# Patient Record
Sex: FEMALE | Race: WHITE | NOT HISPANIC OR LATINO | URBAN - METROPOLITAN AREA
[De-identification: names, ages, dates, MRNs, and addresses within clinical notes are randomized per-mention and may not be internally consistent; named-entity substitution may affect disease eponyms.]

---

## 2023-10-04 ENCOUNTER — APPOINTMENT (OUTPATIENT)
Dept: RADIOLOGY | Facility: HOSPITAL | Age: 18
DRG: 399 | End: 2023-10-04
Payer: COMMERCIAL

## 2023-10-04 ENCOUNTER — HOSPITAL ENCOUNTER (OUTPATIENT)
Facility: HOSPITAL | Age: 18
Setting detail: OBSERVATION
LOS: 1 days | Discharge: HOME | DRG: 399 | End: 2023-10-05
Attending: SURGERY | Admitting: SURGERY
Payer: COMMERCIAL

## 2023-10-04 DIAGNOSIS — K37 APPENDICITIS: Primary | ICD-10-CM

## 2023-10-04 LAB
ALBUMIN SERPL BCP-MCNC: 4.6 G/DL (ref 3.4–5)
ALP SERPL-CCNC: 45 U/L (ref 33–110)
ALT SERPL W P-5'-P-CCNC: 11 U/L (ref 7–45)
ANION GAP SERPL CALC-SCNC: 16 MMOL/L (ref 10–20)
APPEARANCE UR: ABNORMAL
AST SERPL W P-5'-P-CCNC: 16 U/L (ref 9–39)
BACTERIA #/AREA URNS AUTO: ABNORMAL /HPF
BASOPHILS # BLD AUTO: 0.03 X10*3/UL (ref 0–0.1)
BASOPHILS NFR BLD AUTO: 0.1 %
BILIRUB SERPL-MCNC: 0.6 MG/DL (ref 0–1.2)
BILIRUB UR STRIP.AUTO-MCNC: NEGATIVE MG/DL
BUN SERPL-MCNC: 10 MG/DL (ref 6–23)
CALCIUM SERPL-MCNC: 9.9 MG/DL (ref 8.6–10.3)
CHLORIDE SERPL-SCNC: 104 MMOL/L (ref 98–107)
CO2 SERPL-SCNC: 21 MMOL/L (ref 21–32)
COLOR UR: YELLOW
CREAT SERPL-MCNC: 0.63 MG/DL (ref 0.5–1.05)
EOSINOPHIL # BLD AUTO: 0 X10*3/UL (ref 0–0.7)
EOSINOPHIL NFR BLD AUTO: 0 %
ERYTHROCYTE [DISTWIDTH] IN BLOOD BY AUTOMATED COUNT: 12.2 % (ref 11.5–14.5)
GFR SERPL CREATININE-BSD FRML MDRD: >90 ML/MIN/1.73M*2
GLUCOSE SERPL-MCNC: 102 MG/DL (ref 74–99)
GLUCOSE UR STRIP.AUTO-MCNC: NEGATIVE MG/DL
HCG UR QL IA.RAPID: NEGATIVE
HCT VFR BLD AUTO: 39.7 % (ref 36–46)
HGB BLD-MCNC: 13.1 G/DL (ref 12–16)
IMM GRANULOCYTES # BLD AUTO: 0.13 X10*3/UL (ref 0–0.7)
IMM GRANULOCYTES NFR BLD AUTO: 0.5 % (ref 0–0.9)
KETONES UR STRIP.AUTO-MCNC: ABNORMAL MG/DL
LACTATE SERPL-SCNC: 1.5 MMOL/L (ref 0.4–2)
LEUKOCYTE ESTERASE UR QL STRIP.AUTO: ABNORMAL
LYMPHOCYTES # BLD AUTO: 0.51 X10*3/UL (ref 1.2–4.8)
LYMPHOCYTES NFR BLD AUTO: 2.1 %
MCH RBC QN AUTO: 29 PG (ref 26–34)
MCHC RBC AUTO-ENTMCNC: 33 G/DL (ref 32–36)
MCV RBC AUTO: 88 FL (ref 80–100)
MONOCYTES # BLD AUTO: 1.12 X10*3/UL (ref 0.1–1)
MONOCYTES NFR BLD AUTO: 4.7 %
MUCOUS THREADS #/AREA URNS AUTO: ABNORMAL /LPF
NEUTROPHILS # BLD AUTO: 22.11 X10*3/UL (ref 1.2–7.7)
NEUTROPHILS NFR BLD AUTO: 92.6 %
NITRITE UR QL STRIP.AUTO: NEGATIVE
NRBC BLD-RTO: 0 /100 WBCS (ref 0–0)
PH UR STRIP.AUTO: 5 [PH]
PLATELET # BLD AUTO: 299 X10*3/UL (ref 150–450)
PMV BLD AUTO: 11.4 FL (ref 7.5–11.5)
POTASSIUM SERPL-SCNC: 3.8 MMOL/L (ref 3.5–5.3)
PROT SERPL-MCNC: 7.8 G/DL (ref 6.4–8.2)
PROT UR STRIP.AUTO-MCNC: ABNORMAL MG/DL
RBC # BLD AUTO: 4.51 X10*6/UL (ref 4–5.2)
RBC # UR STRIP.AUTO: NEGATIVE /UL
RBC #/AREA URNS AUTO: ABNORMAL /HPF
SODIUM SERPL-SCNC: 137 MMOL/L (ref 136–145)
SP GR UR STRIP.AUTO: 1.03
SQUAMOUS #/AREA URNS AUTO: ABNORMAL /HPF
UROBILINOGEN UR STRIP.AUTO-MCNC: <2 MG/DL
WBC # BLD AUTO: 23.9 X10*3/UL (ref 4.4–11.3)
WBC #/AREA URNS AUTO: ABNORMAL /HPF

## 2023-10-04 PROCEDURE — 96375 TX/PRO/DX INJ NEW DRUG ADDON: CPT

## 2023-10-04 PROCEDURE — 74177 CT ABD & PELVIS W/CONTRAST: CPT

## 2023-10-04 PROCEDURE — 2550000001 HC RX 255 CONTRASTS: Performed by: REGISTERED NURSE

## 2023-10-04 PROCEDURE — 74177 CT ABD & PELVIS W/CONTRAST: CPT | Performed by: STUDENT IN AN ORGANIZED HEALTH CARE EDUCATION/TRAINING PROGRAM

## 2023-10-04 PROCEDURE — 1210000001 HC SEMI-PRIVATE ROOM DAILY

## 2023-10-04 PROCEDURE — 99285 EMERGENCY DEPT VISIT HI MDM: CPT | Performed by: EMERGENCY MEDICINE

## 2023-10-04 PROCEDURE — 81001 URINALYSIS AUTO W/SCOPE: CPT | Performed by: REGISTERED NURSE

## 2023-10-04 PROCEDURE — 36415 COLL VENOUS BLD VENIPUNCTURE: CPT | Performed by: REGISTERED NURSE

## 2023-10-04 PROCEDURE — 83605 ASSAY OF LACTIC ACID: CPT | Performed by: REGISTERED NURSE

## 2023-10-04 PROCEDURE — 85025 COMPLETE CBC W/AUTO DIFF WBC: CPT | Performed by: REGISTERED NURSE

## 2023-10-04 PROCEDURE — 80053 COMPREHEN METABOLIC PANEL: CPT | Performed by: REGISTERED NURSE

## 2023-10-04 PROCEDURE — 81025 URINE PREGNANCY TEST: CPT | Performed by: REGISTERED NURSE

## 2023-10-04 PROCEDURE — 2500000004 HC RX 250 GENERAL PHARMACY W/ HCPCS (ALT 636 FOR OP/ED): Performed by: REGISTERED NURSE

## 2023-10-04 RX ORDER — ONDANSETRON HYDROCHLORIDE 2 MG/ML
4 INJECTION, SOLUTION INTRAVENOUS EVERY 6 HOURS PRN
Status: DISCONTINUED | OUTPATIENT
Start: 2023-10-04 | End: 2023-10-06 | Stop reason: HOSPADM

## 2023-10-04 RX ORDER — ONDANSETRON HYDROCHLORIDE 2 MG/ML
4 INJECTION, SOLUTION INTRAVENOUS ONCE
Status: COMPLETED | OUTPATIENT
Start: 2023-10-04 | End: 2023-10-04

## 2023-10-04 RX ORDER — MORPHINE SULFATE 2 MG/ML
2 INJECTION, SOLUTION INTRAMUSCULAR; INTRAVENOUS ONCE
Status: COMPLETED | OUTPATIENT
Start: 2023-10-04 | End: 2023-10-05

## 2023-10-04 RX ORDER — SODIUM CHLORIDE, SODIUM LACTATE, POTASSIUM CHLORIDE, CALCIUM CHLORIDE 600; 310; 30; 20 MG/100ML; MG/100ML; MG/100ML; MG/100ML
100 INJECTION, SOLUTION INTRAVENOUS CONTINUOUS
Status: DISCONTINUED | OUTPATIENT
Start: 2023-10-04 | End: 2023-10-06 | Stop reason: HOSPADM

## 2023-10-04 RX ORDER — MORPHINE SULFATE 2 MG/ML
2 INJECTION, SOLUTION INTRAMUSCULAR; INTRAVENOUS ONCE
Status: COMPLETED | OUTPATIENT
Start: 2023-10-04 | End: 2023-10-04

## 2023-10-04 RX ADMIN — SODIUM CHLORIDE 500 ML: 9 INJECTION, SOLUTION INTRAVENOUS at 21:40

## 2023-10-04 RX ADMIN — MORPHINE SULFATE 2 MG: 2 INJECTION, SOLUTION INTRAMUSCULAR; INTRAVENOUS at 21:41

## 2023-10-04 RX ADMIN — IOHEXOL 67 ML: 350 INJECTION, SOLUTION INTRAVENOUS at 22:26

## 2023-10-04 RX ADMIN — ONDANSETRON 4 MG: 2 INJECTION INTRAMUSCULAR; INTRAVENOUS at 21:41

## 2023-10-04 ASSESSMENT — PAIN SCALES - GENERAL
PAINLEVEL_OUTOF10: 5 - MODERATE PAIN
PAINLEVEL_OUTOF10: 7

## 2023-10-04 ASSESSMENT — COLUMBIA-SUICIDE SEVERITY RATING SCALE - C-SSRS
2. HAVE YOU ACTUALLY HAD ANY THOUGHTS OF KILLING YOURSELF?: NO
6. HAVE YOU EVER DONE ANYTHING, STARTED TO DO ANYTHING, OR PREPARED TO DO ANYTHING TO END YOUR LIFE?: NO
1. IN THE PAST MONTH, HAVE YOU WISHED YOU WERE DEAD OR WISHED YOU COULD GO TO SLEEP AND NOT WAKE UP?: NO

## 2023-10-04 ASSESSMENT — LIFESTYLE VARIABLES
EVER HAD A DRINK FIRST THING IN THE MORNING TO STEADY YOUR NERVES TO GET RID OF A HANGOVER: NO
HAVE YOU EVER FELT YOU SHOULD CUT DOWN ON YOUR DRINKING: NO
EVER FELT BAD OR GUILTY ABOUT YOUR DRINKING: NO
HAVE PEOPLE ANNOYED YOU BY CRITICIZING YOUR DRINKING: NO

## 2023-10-04 ASSESSMENT — PAIN DESCRIPTION - PROGRESSION: CLINICAL_PROGRESSION: GRADUALLY IMPROVING

## 2023-10-04 ASSESSMENT — PAIN - FUNCTIONAL ASSESSMENT: PAIN_FUNCTIONAL_ASSESSMENT: 0-10

## 2023-10-05 ENCOUNTER — ANESTHESIA EVENT (OUTPATIENT)
Dept: OPERATING ROOM | Facility: HOSPITAL | Age: 18
DRG: 399 | End: 2023-10-05
Payer: COMMERCIAL

## 2023-10-05 ENCOUNTER — ANESTHESIA (OUTPATIENT)
Dept: OPERATING ROOM | Facility: HOSPITAL | Age: 18
DRG: 399 | End: 2023-10-05
Payer: COMMERCIAL

## 2023-10-05 VITALS
HEART RATE: 81 BPM | TEMPERATURE: 97.2 F | HEIGHT: 64 IN | BODY MASS INDEX: 16.82 KG/M2 | OXYGEN SATURATION: 97 % | DIASTOLIC BLOOD PRESSURE: 59 MMHG | SYSTOLIC BLOOD PRESSURE: 107 MMHG | WEIGHT: 98.55 LBS | RESPIRATION RATE: 16 BRPM

## 2023-10-05 PROBLEM — T88.3XXA MALIGNANT HYPERTHERMIA: Status: ACTIVE | Noted: 2023-10-05

## 2023-10-05 PROCEDURE — 7100000002 HC RECOVERY ROOM TIME - EACH INCREMENTAL 1 MINUTE: Performed by: SURGERY

## 2023-10-05 PROCEDURE — 88304 TISSUE EXAM BY PATHOLOGIST: CPT

## 2023-10-05 PROCEDURE — 3600000004 HC OR TIME - INITIAL BASE CHARGE - PROCEDURE LEVEL FOUR: Performed by: SURGERY

## 2023-10-05 PROCEDURE — 2720000007 HC OR 272 NO HCPCS: Performed by: SURGERY

## 2023-10-05 PROCEDURE — 96365 THER/PROPH/DIAG IV INF INIT: CPT

## 2023-10-05 PROCEDURE — G0378 HOSPITAL OBSERVATION PER HR: HCPCS

## 2023-10-05 PROCEDURE — 88304 TISSUE EXAM BY PATHOLOGIST: CPT | Mod: TC,CMCLAB,ELYLAB | Performed by: SURGERY

## 2023-10-05 PROCEDURE — 99221 1ST HOSP IP/OBS SF/LOW 40: CPT | Performed by: SURGERY

## 2023-10-05 PROCEDURE — 2500000004 HC RX 250 GENERAL PHARMACY W/ HCPCS (ALT 636 FOR OP/ED): Performed by: SURGERY

## 2023-10-05 PROCEDURE — 2500000004 HC RX 250 GENERAL PHARMACY W/ HCPCS (ALT 636 FOR OP/ED): Performed by: ANESTHESIOLOGY

## 2023-10-05 PROCEDURE — 2580000001 HC RX 258 IV SOLUTIONS: Performed by: SURGERY

## 2023-10-05 PROCEDURE — 3700000001 HC GENERAL ANESTHESIA TIME - INITIAL BASE CHARGE: Performed by: SURGERY

## 2023-10-05 PROCEDURE — 96375 TX/PRO/DX INJ NEW DRUG ADDON: CPT

## 2023-10-05 PROCEDURE — 96366 THER/PROPH/DIAG IV INF ADDON: CPT | Mod: 59

## 2023-10-05 PROCEDURE — 3700000002 HC GENERAL ANESTHESIA TIME - EACH INCREMENTAL 1 MINUTE: Performed by: SURGERY

## 2023-10-05 PROCEDURE — 44970 LAPAROSCOPY APPENDECTOMY: CPT | Performed by: SURGERY

## 2023-10-05 PROCEDURE — 7100000001 HC RECOVERY ROOM TIME - INITIAL BASE CHARGE: Performed by: SURGERY

## 2023-10-05 PROCEDURE — 2500000004 HC RX 250 GENERAL PHARMACY W/ HCPCS (ALT 636 FOR OP/ED): Performed by: NURSE ANESTHETIST, CERTIFIED REGISTERED

## 2023-10-05 PROCEDURE — 88304 TISSUE EXAM BY PATHOLOGIST: CPT | Mod: TC,SUR,CMCLAB,ELYLAB | Performed by: SURGERY

## 2023-10-05 PROCEDURE — 2500000005 HC RX 250 GENERAL PHARMACY W/O HCPCS: Performed by: NURSE ANESTHETIST, CERTIFIED REGISTERED

## 2023-10-05 PROCEDURE — 2500000004 HC RX 250 GENERAL PHARMACY W/ HCPCS (ALT 636 FOR OP/ED): Performed by: REGISTERED NURSE

## 2023-10-05 PROCEDURE — 96376 TX/PRO/DX INJ SAME DRUG ADON: CPT

## 2023-10-05 PROCEDURE — 2500000005 HC RX 250 GENERAL PHARMACY W/O HCPCS: Performed by: SURGERY

## 2023-10-05 PROCEDURE — 3600000009 HC OR TIME - EACH INCREMENTAL 1 MINUTE - PROCEDURE LEVEL FOUR: Performed by: SURGERY

## 2023-10-05 PROCEDURE — 2500000001 HC RX 250 WO HCPCS SELF ADMINISTERED DRUGS (ALT 637 FOR MEDICARE OP): Performed by: SURGERY

## 2023-10-05 PROCEDURE — 2500000001 HC RX 250 WO HCPCS SELF ADMINISTERED DRUGS (ALT 637 FOR MEDICARE OP): Performed by: ANESTHESIOLOGY

## 2023-10-05 RX ORDER — OXYCODONE HYDROCHLORIDE 5 MG/1
5 TABLET ORAL EVERY 4 HOURS PRN
Status: DISCONTINUED | OUTPATIENT
Start: 2023-10-05 | End: 2023-10-05 | Stop reason: HOSPADM

## 2023-10-05 RX ORDER — ROCURONIUM BROMIDE 10 MG/ML
INJECTION, SOLUTION INTRAVENOUS AS NEEDED
Status: DISCONTINUED | OUTPATIENT
Start: 2023-10-05 | End: 2023-10-05

## 2023-10-05 RX ORDER — SODIUM CHLORIDE 9 MG/ML
10 INJECTION, SOLUTION INTRAVENOUS EVERY 8 HOURS
Status: DISCONTINUED | OUTPATIENT
Start: 2023-10-05 | End: 2023-10-06 | Stop reason: HOSPADM

## 2023-10-05 RX ORDER — ENOXAPARIN SODIUM 100 MG/ML
40 INJECTION SUBCUTANEOUS ONCE
Status: DISCONTINUED | OUTPATIENT
Start: 2023-10-05 | End: 2023-10-05

## 2023-10-05 RX ORDER — ACETAMINOPHEN 325 MG/1
650 TABLET ORAL EVERY 4 HOURS PRN
Status: DISCONTINUED | OUTPATIENT
Start: 2023-10-05 | End: 2023-10-05 | Stop reason: HOSPADM

## 2023-10-05 RX ORDER — LIDOCAINE HYDROCHLORIDE 20 MG/ML
INJECTION, SOLUTION INFILTRATION; PERINEURAL AS NEEDED
Status: DISCONTINUED | OUTPATIENT
Start: 2023-10-05 | End: 2023-10-05

## 2023-10-05 RX ORDER — MIDAZOLAM HYDROCHLORIDE 1 MG/ML
INJECTION, SOLUTION INTRAMUSCULAR; INTRAVENOUS AS NEEDED
Status: DISCONTINUED | OUTPATIENT
Start: 2023-10-05 | End: 2023-10-05

## 2023-10-05 RX ORDER — PROPOFOL 10 MG/ML
INJECTION, EMULSION INTRAVENOUS AS NEEDED
Status: DISCONTINUED | OUTPATIENT
Start: 2023-10-05 | End: 2023-10-05

## 2023-10-05 RX ORDER — OXYCODONE HYDROCHLORIDE 5 MG/1
10 TABLET ORAL EVERY 4 HOURS PRN
Status: DISCONTINUED | OUTPATIENT
Start: 2023-10-05 | End: 2023-10-05 | Stop reason: HOSPADM

## 2023-10-05 RX ORDER — BUPIVACAINE HCL/EPINEPHRINE 0.25-.0005
VIAL (ML) INJECTION AS NEEDED
Status: DISCONTINUED | OUTPATIENT
Start: 2023-10-05 | End: 2023-10-05 | Stop reason: HOSPADM

## 2023-10-05 RX ORDER — FENTANYL CITRATE 50 UG/ML
INJECTION, SOLUTION INTRAMUSCULAR; INTRAVENOUS AS NEEDED
Status: DISCONTINUED | OUTPATIENT
Start: 2023-10-05 | End: 2023-10-05

## 2023-10-05 RX ORDER — MEPERIDINE HYDROCHLORIDE 25 MG/ML
12.5 INJECTION INTRAMUSCULAR; INTRAVENOUS; SUBCUTANEOUS EVERY 10 MIN PRN
Status: DISCONTINUED | OUTPATIENT
Start: 2023-10-05 | End: 2023-10-05 | Stop reason: HOSPADM

## 2023-10-05 RX ORDER — OXYCODONE HYDROCHLORIDE 5 MG/1
10 TABLET ORAL EVERY 6 HOURS PRN
Status: DISCONTINUED | OUTPATIENT
Start: 2023-10-05 | End: 2023-10-06 | Stop reason: HOSPADM

## 2023-10-05 RX ORDER — OXYCODONE HYDROCHLORIDE 5 MG/1
5 TABLET ORAL EVERY 6 HOURS PRN
Qty: 15 TABLET | Refills: 0 | Status: SHIPPED | OUTPATIENT
Start: 2023-10-05

## 2023-10-05 RX ORDER — PHENYLEPHRINE HCL IN 0.9% NACL 1 MG/10 ML
SYRINGE (ML) INTRAVENOUS AS NEEDED
Status: DISCONTINUED | OUTPATIENT
Start: 2023-10-05 | End: 2023-10-05

## 2023-10-05 RX ORDER — HEPARIN SODIUM 5000 [USP'U]/ML
5000 INJECTION, SOLUTION INTRAVENOUS; SUBCUTANEOUS ONCE
Status: COMPLETED | OUTPATIENT
Start: 2023-10-05 | End: 2023-10-05

## 2023-10-05 RX ORDER — KETOROLAC TROMETHAMINE 30 MG/ML
15 INJECTION, SOLUTION INTRAMUSCULAR; INTRAVENOUS EVERY 6 HOURS PRN
Status: DISCONTINUED | OUTPATIENT
Start: 2023-10-05 | End: 2023-10-06 | Stop reason: HOSPADM

## 2023-10-05 RX ORDER — FENTANYL CITRATE 50 UG/ML
25 INJECTION, SOLUTION INTRAMUSCULAR; INTRAVENOUS EVERY 5 MIN PRN
Status: DISCONTINUED | OUTPATIENT
Start: 2023-10-05 | End: 2023-10-05 | Stop reason: HOSPADM

## 2023-10-05 RX ORDER — FENTANYL CITRATE 50 UG/ML
50 INJECTION, SOLUTION INTRAMUSCULAR; INTRAVENOUS EVERY 5 MIN PRN
Status: DISCONTINUED | OUTPATIENT
Start: 2023-10-05 | End: 2023-10-05 | Stop reason: HOSPADM

## 2023-10-05 RX ORDER — OXYCODONE HYDROCHLORIDE 5 MG/1
5 TABLET ORAL EVERY 6 HOURS PRN
Status: DISCONTINUED | OUTPATIENT
Start: 2023-10-05 | End: 2023-10-06 | Stop reason: HOSPADM

## 2023-10-05 RX ADMIN — ONDANSETRON 4 MG: 2 INJECTION INTRAMUSCULAR; INTRAVENOUS at 13:49

## 2023-10-05 RX ADMIN — Medication 100 MCG: at 12:53

## 2023-10-05 RX ADMIN — ROCURONIUM BROMIDE 40 MG: 10 SOLUTION INTRAVENOUS at 12:43

## 2023-10-05 RX ADMIN — SODIUM CHLORIDE 10 ML/HR: 9 INJECTION, SOLUTION INTRAVENOUS at 06:07

## 2023-10-05 RX ADMIN — MORPHINE SULFATE 2 MG: 2 INJECTION, SOLUTION INTRAMUSCULAR; INTRAVENOUS at 00:23

## 2023-10-05 RX ADMIN — Medication 100 MCG: at 13:02

## 2023-10-05 RX ADMIN — KETOROLAC TROMETHAMINE 15 MG: 30 INJECTION, SOLUTION INTRAMUSCULAR at 13:49

## 2023-10-05 RX ADMIN — OXYCODONE HYDROCHLORIDE 5 MG: 5 TABLET ORAL at 20:03

## 2023-10-05 RX ADMIN — OXYCODONE HYDROCHLORIDE 5 MG: 5 TABLET ORAL at 15:03

## 2023-10-05 RX ADMIN — SODIUM CHLORIDE, SODIUM LACTATE, POTASSIUM CHLORIDE, AND CALCIUM CHLORIDE 100 ML/HR: 600; 310; 30; 20 INJECTION, SOLUTION INTRAVENOUS at 02:02

## 2023-10-05 RX ADMIN — MIDAZOLAM 2 MG: 1 INJECTION INTRAMUSCULAR; INTRAVENOUS at 12:36

## 2023-10-05 RX ADMIN — PIPERACILLIN SODIUM AND TAZOBACTAM SODIUM 3.38 G: 3; .375 INJECTION, SOLUTION INTRAVENOUS at 06:07

## 2023-10-05 RX ADMIN — HYDROMORPHONE HYDROCHLORIDE 0.5 MG: 1 INJECTION, SOLUTION INTRAMUSCULAR; INTRAVENOUS; SUBCUTANEOUS at 02:03

## 2023-10-05 RX ADMIN — SODIUM CHLORIDE, SODIUM LACTATE, POTASSIUM CHLORIDE, AND CALCIUM CHLORIDE: 600; 310; 30; 20 INJECTION, SOLUTION INTRAVENOUS at 13:50

## 2023-10-05 RX ADMIN — HYDROMORPHONE HYDROCHLORIDE 0.5 MG: 1 INJECTION, SOLUTION INTRAMUSCULAR; INTRAVENOUS; SUBCUTANEOUS at 06:03

## 2023-10-05 RX ADMIN — HYDROMORPHONE HYDROCHLORIDE 0.5 MG: 1 INJECTION, SOLUTION INTRAMUSCULAR; INTRAVENOUS; SUBCUTANEOUS at 12:15

## 2023-10-05 RX ADMIN — FENTANYL CITRATE 50 MCG: 50 INJECTION, SOLUTION INTRAMUSCULAR; INTRAVENOUS at 12:43

## 2023-10-05 RX ADMIN — LIDOCAINE HYDROCHLORIDE 40 MG: 20 INJECTION, SOLUTION INFILTRATION; PERINEURAL at 12:43

## 2023-10-05 RX ADMIN — HEPARIN SODIUM 5000 UNITS: 5000 INJECTION INTRAVENOUS; SUBCUTANEOUS at 12:33

## 2023-10-05 RX ADMIN — FENTANYL CITRATE 25 MCG: 50 INJECTION, SOLUTION INTRAMUSCULAR; INTRAVENOUS at 14:52

## 2023-10-05 RX ADMIN — FENTANYL CITRATE 50 MCG: 50 INJECTION, SOLUTION INTRAMUSCULAR; INTRAVENOUS at 13:14

## 2023-10-05 RX ADMIN — FENTANYL CITRATE 25 MCG: 50 INJECTION, SOLUTION INTRAMUSCULAR; INTRAVENOUS at 14:59

## 2023-10-05 RX ADMIN — PIPERACILLIN SODIUM AND TAZOBACTAM SODIUM 3.38 G: 3; .375 INJECTION, SOLUTION INTRAVENOUS at 00:23

## 2023-10-05 RX ADMIN — DEXAMETHASONE SODIUM PHOSPHATE 8 MG: 4 INJECTION, SOLUTION INTRAMUSCULAR; INTRAVENOUS at 12:57

## 2023-10-05 RX ADMIN — SUGAMMADEX 200 MG: 100 INJECTION, SOLUTION INTRAVENOUS at 13:49

## 2023-10-05 RX ADMIN — PROPOFOL 200 MG: 10 INJECTION, EMULSION INTRAVENOUS at 12:43

## 2023-10-05 SDOH — SOCIAL STABILITY: SOCIAL INSECURITY: DO YOU FEEL ANYONE HAS EXPLOITED OR TAKEN ADVANTAGE OF YOU FINANCIALLY OR OF YOUR PERSONAL PROPERTY?: NO

## 2023-10-05 SDOH — SOCIAL STABILITY: SOCIAL INSECURITY: DOES ANYONE TRY TO KEEP YOU FROM HAVING/CONTACTING OTHER FRIENDS OR DOING THINGS OUTSIDE YOUR HOME?: NO

## 2023-10-05 SDOH — SOCIAL STABILITY: SOCIAL INSECURITY: DO YOU FEEL UNSAFE GOING BACK TO THE PLACE WHERE YOU ARE LIVING?: NO

## 2023-10-05 SDOH — SOCIAL STABILITY: SOCIAL INSECURITY: ABUSE: ADULT

## 2023-10-05 SDOH — SOCIAL STABILITY: SOCIAL INSECURITY: WERE YOU ABLE TO COMPLETE ALL THE BEHAVIORAL HEALTH SCREENINGS?: YES

## 2023-10-05 SDOH — SOCIAL STABILITY: SOCIAL INSECURITY: HAVE YOU HAD THOUGHTS OF HARMING ANYONE ELSE?: NO

## 2023-10-05 SDOH — SOCIAL STABILITY: SOCIAL INSECURITY: HAS ANYONE EVER THREATENED TO HURT YOUR FAMILY OR YOUR PETS?: NO

## 2023-10-05 SDOH — SOCIAL STABILITY: SOCIAL INSECURITY: ARE YOU OR HAVE YOU BEEN THREATENED OR ABUSED PHYSICALLY, EMOTIONALLY, OR SEXUALLY BY ANYONE?: NO

## 2023-10-05 SDOH — SOCIAL STABILITY: SOCIAL INSECURITY: ARE THERE ANY APPARENT SIGNS OF INJURIES/BEHAVIORS THAT COULD BE RELATED TO ABUSE/NEGLECT?: NO

## 2023-10-05 ASSESSMENT — COGNITIVE AND FUNCTIONAL STATUS - GENERAL
PATIENT BASELINE BEDBOUND: NO
MOBILITY SCORE: 24
DRESSING REGULAR UPPER BODY CLOTHING: A LITTLE
MOBILITY SCORE: 24
DAILY ACTIVITIY SCORE: 24
DAILY ACTIVITIY SCORE: 23

## 2023-10-05 ASSESSMENT — LIFESTYLE VARIABLES
SUBSTANCE_ABUSE_PAST_12_MONTHS: NO
HOW OFTEN DO YOU HAVE 6 OR MORE DRINKS ON ONE OCCASION: NEVER
AUDIT-C TOTAL SCORE: 0
HOW OFTEN DO YOU HAVE A DRINK CONTAINING ALCOHOL: NEVER
AUDIT-C TOTAL SCORE: 0
HOW OFTEN DO YOU HAVE 6 OR MORE DRINKS ON ONE OCCASION: NEVER
HOW MANY STANDARD DRINKS CONTAINING ALCOHOL DO YOU HAVE ON A TYPICAL DAY: PATIENT DOES NOT DRINK
HOW OFTEN DO YOU HAVE A DRINK CONTAINING ALCOHOL: NEVER
AUDIT-C TOTAL SCORE: 0
SKIP TO QUESTIONS 9-10: 1
HOW MANY STANDARD DRINKS CONTAINING ALCOHOL DO YOU HAVE ON A TYPICAL DAY: PATIENT DOES NOT DRINK
SKIP TO QUESTIONS 9-10: 1
AUDIT-C TOTAL SCORE: 0
PRESCIPTION_ABUSE_PAST_12_MONTHS: NO

## 2023-10-05 ASSESSMENT — ACTIVITIES OF DAILY LIVING (ADL)
BATHING: INDEPENDENT
WALKS IN HOME: INDEPENDENT
HEARING - LEFT EAR: FUNCTIONAL
TOILETING: INDEPENDENT
HEARING - RIGHT EAR: FUNCTIONAL
JUDGMENT_ADEQUATE_SAFELY_COMPLETE_DAILY_ACTIVITIES: YES
FEEDING YOURSELF: INDEPENDENT
HEARING - RIGHT EAR: FUNCTIONAL
DRESSING YOURSELF: INDEPENDENT
HEARING - LEFT EAR: FUNCTIONAL
PATIENT'S MEMORY ADEQUATE TO SAFELY COMPLETE DAILY ACTIVITIES?: YES
GROOMING: INDEPENDENT
GROOMING: INDEPENDENT
ADEQUATE_TO_COMPLETE_ADL: YES
TOILETING: INDEPENDENT
FEEDING YOURSELF: INDEPENDENT
DRESSING YOURSELF: INDEPENDENT
WALKS IN HOME: INDEPENDENT
BATHING: INDEPENDENT
ADEQUATE_TO_COMPLETE_ADL: YES
PATIENT'S MEMORY ADEQUATE TO SAFELY COMPLETE DAILY ACTIVITIES?: YES
JUDGMENT_ADEQUATE_SAFELY_COMPLETE_DAILY_ACTIVITIES: YES

## 2023-10-05 ASSESSMENT — PAIN SCALES - GENERAL
PAINLEVEL_OUTOF10: 5 - MODERATE PAIN
PAINLEVEL_OUTOF10: 6
PAIN_LEVEL: 0
PAINLEVEL_OUTOF10: 6
PAINLEVEL_OUTOF10: 6
PAINLEVEL_OUTOF10: 5 - MODERATE PAIN
PAINLEVEL_OUTOF10: 10 - WORST POSSIBLE PAIN
PAINLEVEL_OUTOF10: 3
PAINLEVEL_OUTOF10: 2
PAINLEVEL_OUTOF10: 2
PAINLEVEL_OUTOF10: 8
PAINLEVEL_OUTOF10: 4
PAINLEVEL_OUTOF10: 8

## 2023-10-05 ASSESSMENT — PAIN DESCRIPTION - DESCRIPTORS
DESCRIPTORS: SHARP
DESCRIPTORS: DISCOMFORT
DESCRIPTORS: DULL
DESCRIPTORS: DULL

## 2023-10-05 ASSESSMENT — PAIN - FUNCTIONAL ASSESSMENT
PAIN_FUNCTIONAL_ASSESSMENT: 0-10

## 2023-10-05 ASSESSMENT — PATIENT HEALTH QUESTIONNAIRE - PHQ9
SUM OF ALL RESPONSES TO PHQ9 QUESTIONS 1 & 2: 0
2. FEELING DOWN, DEPRESSED OR HOPELESS: NOT AT ALL
2. FEELING DOWN, DEPRESSED OR HOPELESS: NOT AT ALL
SUM OF ALL RESPONSES TO PHQ9 QUESTIONS 1 & 2: 0
1. LITTLE INTEREST OR PLEASURE IN DOING THINGS: NOT AT ALL
1. LITTLE INTEREST OR PLEASURE IN DOING THINGS: NOT AT ALL

## 2023-10-05 NOTE — H&P
"This history Of Present Illness  Olamide Gallardo is a 18 y.o. female presenting with appendicitis.  Brief this is a 18-year-old female with an otherwise unremarkable past medical history of 1 day history of right lower quadrant pain.  Also endorses nausea and vomiting.  She presented to the emergency room.  She was febrile to 38.3 °C, and tachycardic up to 125 bpm.  She was found to have a leukocytosis of 23.9K.  CT abdomen pelvis demonstrated acute appendicitis with a fecalith.  General surgery was consulted and she was admitted, started on IV antibiotics, started on IV fluids, with plans for further work-up and management.     Past Medical History  She has no past medical history on file.    Surgical History  She has no past surgical history on file.     Social History  She reports that she has never smoked. She has never used smokeless tobacco. She reports that she does not currently use alcohol. She reports that she does not use drugs.    Family History  No family history on file.     Allergies  Patient has no allergy information on record.    Review of Systems  Negative other than HPI.     Physical Exam   Gen: Ill but non-toxic appearing  Abdomen: Soft , Nondistended, peritonitic, no surgical scars noticed  Last Recorded Vitals  Blood pressure 112/56, pulse (!) 125, temperature 38.3 °C (100.9 °F), temperature source Temporal, resp. rate 14, height 1.626 m (5' 4\"), weight (!) 44.7 kg (98 lb 8.7 oz), SpO2 96 %.    Relevant Results        Component      Latest Ref Rn 10/4/2023   WBC      4.4 - 11.3 x10*3/uL 23.9 (H)    nRBC      0.0 - 0.0 /100 WBCs 0.0    RBC      4.00 - 5.20 x10*6/uL 4.51    HEMOGLOBIN      12.0 - 16.0 g/dL 13.1    HEMATOCRIT      36.0 - 46.0 % 39.7    MCV      80 - 100 fL 88    MCH      26.0 - 34.0 pg 29.0    MCHC      32.0 - 36.0 g/dL 33.0    RED CELL DISTRIBUTION WIDTH      11.5 - 14.5 % 12.2    Platelets      150 - 450 x10*3/uL 299    MEAN PLATELET VOLUME      7.5 - 11.5 fL 11.4  "   Neutrophils %      40.0 - 80.0 % 92.6    Immature Granulocytes %, Automated      0.0 - 0.9 % 0.5    Lymphocytes %      13.0 - 44.0 % 2.1    Monocytes %      2.0 - 10.0 % 4.7    Eosinophils %      0.0 - 6.0 % 0.0    Basophils %      0.0 - 2.0 % 0.1    Neutrophils Absolute      1.20 - 7.70 x10*3/uL 22.11 (H)    Immature Granulocytes Absolute, Automated      0.00 - 0.70 x10*3/uL 0.13    Lymphocytes Absolute      1.20 - 4.80 x10*3/uL 0.51 (L)    Monocytes Absolute      0.10 - 1.00 x10*3/uL 1.12 (H)    Eosinophils Absolute      0.00 - 0.70 x10*3/uL 0.00    Basophils Absolute      0.00 - 0.10 x10*3/uL 0.03    GLUCOSE      74 - 99 mg/dL 102 (H)    SODIUM      136 - 145 mmol/L 137    POTASSIUM      3.5 - 5.3 mmol/L 3.8    CHLORIDE      98 - 107 mmol/L 104    Bicarbonate      21 - 32 mmol/L 21    Anion Gap      10 - 20 mmol/L 16    Blood Urea Nitrogen      6 - 23 mg/dL 10    Creatinine      0.50 - 1.05 mg/dL 0.63    EGFR      >60 mL/min/1.73m*2 >90    Calcium      8.6 - 10.3 mg/dL 9.9    Albumin      3.4 - 5.0 g/dL 4.6    Alkaline Phosphatase      33 - 110 U/L 45    Total Protein      6.4 - 8.2 g/dL 7.8    AST      9 - 39 U/L 16    Bilirubin Total      0.0 - 1.2 mg/dL 0.6    ALT      7 - 45 U/L 11    Color, Urine      Straw, Yellow  Yellow    Appearance, Urine      Clear  Hazy ! (N)    Specific Gravity, Urine      1.005 - 1.035  1.027    pH, Urine      5.0, 5.5, 6.0, 6.5, 7.0, 7.5, 8.0  5.0    Protein, Urine      NEGATIVE mg/dL 30 (1+) ! (N)    Glucose, Urine      NEGATIVE mg/dL NEGATIVE    Blood, Urine      NEGATIVE  NEGATIVE    Ketones, Urine      NEGATIVE mg/dL 80 (2+) !    Bilirubin, Urine      NEGATIVE  NEGATIVE    Urobilinogen, Urine      <2.0 mg/dL <2.0    Nitrite, Urine      NEGATIVE  NEGATIVE    Leukocyte Esterase, Urine      NEGATIVE  SMALL (1+) !    WBC, Urine      1-5, NONE /HPF 11-20 !    RBC, Urine      NONE, 1-2, 3-5 /HPF 3-5    Squamous Epithelial Cells, Urine      Reference range not established. /HPF 1-9  (SPARSE)    Bacteria, Urine      NONE SEEN /HPF 1+ !    Mucus, Urine      Reference range not established. /LPF 2+    HCG, Urine      NEGATIVE  NEGATIVE    Lactate      0.4 - 2.0 mmol/L 1.5       Legend:  (H) High  (L) Low  ! (N) Normal  ! Abnormal     CT abd/pel - acute appendicitis with fecolith    Assessment/Plan   Principal Problem:    Appendicitis      18-year-old female with acute nonperforated appendicitis with a fecalith.  No fecalith and otherwise good health, I recommended a laparoscopic appendectomy.  Risk, benefits, alternatives were explained to the patient she consented to the procedure.  Plan to go to the OR today.  N.p.o., IV fluids, IV antibiotics until that time.       I spent 30 minutes in the professional and overall care of this patient.      Rinku Boyce MD

## 2023-10-05 NOTE — ANESTHESIA PROCEDURE NOTES
Airway  Date/Time: 10/5/2023 12:47 PM  Urgency: elective      Staffing  Performed: CRNA   Authorized by: Abdi Caicedo MD    Performed by: KRISS Felton-CRNA  Patient location during procedure: OR    Indications and Patient Condition  Indications for airway management: anesthesia  Mask difficulty assessment: 1 - vent by mask    Final Airway Details  Final airway type: endotracheal airway      Successful airway: ETT  Cuffed: yes   Successful intubation technique: direct laryngoscopy  Facilitating devices/methods: intubating stylet  Endotracheal tube insertion site: oral  Blade: Heather  Blade size: #3  ETT size (mm): 7.0  Cormack-Lehane Classification: grade I - full view of glottis  Placement verified by: chest auscultation and capnometry   Cuff volume (mL): 6  Measured from: teeth  ETT to teeth (cm): 20  Number of attempts at approach: 1    Additional Comments  ATRAUMATIC

## 2023-10-05 NOTE — DISCHARGE SUMMARY
Discharge Diagnosis  Appendicitis    Issues Requiring Follow-Up  Follow up in 2 weeks with surger    Test Results Pending At Discharge  Pending Labs       Order Current Status    Surgical Pathology Exam Collected (10/05/23 1346)            Hospital Course   Admitted to the hospital on 10/4/2023 8.  She went to the operating  Uncomplicated laparoscopic appendectomy.  Postoperatively she tolerated her pain, tolerated diet, no active issues.  She was discharged home with instructions for no heavy lifting for 2 to 4 week and to follow up in clinic in 2 weeks.    Pertinent Physical Exam At Time of Discharge  Physical Exam    Home Medications     Medication List      START taking these medications     oxyCODONE 5 mg immediate release tablet; Commonly known as: Roxicodone;   Take 1 tablet (5 mg) by mouth every 6 hours if needed for severe pain (7 -   10).       Outpatient Follow-Up  Call for office follow up in 2 weeks.    Rinku Boyce MD

## 2023-10-05 NOTE — ED PROVIDER NOTES
HPI   Chief Complaint   Patient presents with    Abdominal Pain       18-year-old female denies significant past medical history presents emergency department with sudden onset of abdominal pain, nausea and vomiting.  Patient states that pain is primarily in the bilateral lower quadrants however worse on the right.  Patient characterizes as a sharp pain.  Rates at a 7/10 on the pain scale.  Patient denies any fever or chills, denies any diarrhea.  Patient states that she is not pregnant.  Patient denies any chest pain, shortness of breath, dizziness or lightheadedness, numbness or tingling, headache, urinary symptoms, or weakness.      History provided by:  Patient   used: No                        [place] Coma Scale Score: 15                  Patient History   History reviewed. No pertinent past medical history.  History reviewed. No pertinent surgical history.  No family history on file.  Social History     Tobacco Use    Smoking status: Never    Smokeless tobacco: Never   Substance Use Topics    Alcohol use: Not Currently    Drug use: Never       Physical Exam   ED Triage Vitals [10/04/23 1837]   Temp Heart Rate Resp BP   36.9 °C (98.4 °F) (!) 120 20 128/65      SpO2 Temp Source Heart Rate Source Patient Position   99 % Temporal Monitor Sitting      BP Location FiO2 (%)     Left arm --       Physical Exam    ED Course & MDM   Diagnoses as of 10/04/23 2330   Appendicitis       Medical Decision Making  18-year-old female denies significant past medical history presents emergency department with sudden onset of abdominal pain, nausea and vomiting.  Patient states that pain is primarily in the bilateral lower quadrants however worse on the right.  Patient characterizes as a sharp pain.  Rates at a 7/10 on the pain scale.  Patient denies any fever or chills, denies any diarrhea.  Patient states that she is not pregnant.  Patient denies any chest pain, shortness of breath, dizziness or  lightheadedness, numbness or tingling, headache, urinary symptoms, or weakness.    Seen examined emergency department; patient is healthy and nontoxic but does appear to be in acute pain.  Clinical exam significant for rebound tenderness in the right lower quadrant, guarding, positive psoas sign    We will obtain CT of the abdomen pelvis to evaluate for appendicitis, also obtain CBC, CMP urinalysis and urine pride.  Patient's symptoms treated with IV fluids, IV Zofran and IV morphine.    CT of the abdomen pelvis significant for acute appendicitis.  I did reach out to Dr. Bernstein who accepts patient for admission at this time.  IV Zosyn started.    Upon reevaluation patient states that she was initially starting to feel better however the pain is coming back.  I did update patient on CT findings of appendicitis and need for admission and possibly surgery.  Patient is agreeable for admission at this time.      Labs Reviewed   CBC WITH AUTO DIFFERENTIAL - Abnormal       Result Value    WBC 23.9 (*)     nRBC 0.0      RBC 4.51      Hemoglobin 13.1      Hematocrit 39.7      MCV 88      MCH 29.0      MCHC 33.0      RDW 12.2      Platelets 299      MPV 11.4      Neutrophils % 92.6      Immature Granulocytes %, Automated 0.5      Lymphocytes % 2.1      Monocytes % 4.7      Eosinophils % 0.0      Basophils % 0.1      Neutrophils Absolute 22.11 (*)     Immature Granulocytes Absolute, Automated 0.13      Lymphocytes Absolute 0.51 (*)     Monocytes Absolute 1.12 (*)     Eosinophils Absolute 0.00      Basophils Absolute 0.03     COMPREHENSIVE METABOLIC PANEL - Abnormal    Glucose 102 (*)     Sodium 137      Potassium 3.8      Chloride 104      Bicarbonate 21      Anion Gap 16      Urea Nitrogen 10      Creatinine 0.63      eGFR >90      Calcium 9.9      Albumin 4.6      Alkaline Phosphatase 45      Total Protein 7.8      AST 16      Bilirubin, Total 0.6      ALT 11     URINALYSIS WITH REFLEX MICROSCOPIC - Abnormal    Color, Urine  Yellow      Appearance, Urine Hazy (*)     Specific Gravity, Urine 1.027      pH, Urine 5.0      Protein, Urine 30 (1+) (*)     Glucose, Urine NEGATIVE      Blood, Urine NEGATIVE      Ketones, Urine 80 (2+) (*)     Bilirubin, Urine NEGATIVE      Urobilinogen, Urine <2.0      Nitrite, Urine NEGATIVE      Leukocyte Esterase, Urine SMALL (1+) (*)    URINALYSIS MICROSCOPIC ONLY - Abnormal    WBC, Urine 11-20 (*)     RBC, Urine 3-5      Squamous Epithelial Cells, Urine 1-9 (SPARSE)      Bacteria, Urine 1+ (*)     Mucus, Urine 2+     HCG, URINE, QUALITATIVE - Normal    HCG, Urine NEGATIVE     LACTATE - Normal    Lactate 1.5      Narrative:     Venipuncture immediately after or during the administration of Metamizole may lead to falsely low results. Testing should be performed immediately  prior to Metamizole dosing.       CT abdomen pelvis w IV contrast   Final Result   1. A fluid-filled, tubular structure is present in the right lower   quadrant of the abdomen in close proximity to the cecum, suspected to   represent a somewhat dilated appendix with very mild mucosal wall   enhancement, and a 9 mm appendicoliths present at its proximal end.   Although no significant inflammatory changes/free fluid are present   at this time along the periphery, surgical consultation for   assessment of the early appendicitis is recommended.   2. Moderate to large amount of stool is present in the colon and   rectum, correlate with any clinical symptoms of constipation.        MACRO:   None.             Dictation workstation:   [id number]            Procedure  Procedures     Blanca Vanegas, KRISS-CNP  10/05/23 0004       KRISS Herron-CNP  10/05/23 0007

## 2023-10-05 NOTE — CARE PLAN
Problem: Pain  Goal: Takes deep breaths with improved pain control throughout the shift  Outcome: Progressing  Goal: Turns in bed with improved pain control throughout the shift  Outcome: Progressing     Problem: Skin  Goal: Participates in plan/prevention/treatment measures  Outcome: Progressing   The patient's goals for the shift include      The clinical goals for the shift include Pain control

## 2023-10-05 NOTE — OP NOTE
Appendectomy Laparoscopy Operative Note     Date: 10/4/2023 - 10/5/2023  OR Location: [facility]    Name: Olamide Gallardo, : 2005, Age: 18 y.o., MRN: [id number], Sex: female    Diagnosis  Pre-op Diagnosis     * Appendicitis [K37] Post-op Diagnosis     * Appendicitis [K37]     Procedures  Appendectomy Laparoscopy  [address]397 - [place] LAPAROSCOPIC APPENDECTOMY      Surgeons      * Rinku Boyce - Primary    Resident/Fellow/Other Assistant:  No surgical staff documented.    Procedure Summary  Anesthesia: General  ASA: I  Anesthesia Staff: Anesthesiologist: Abdi Caicedo MD  CRNA: KRISS Felton-CRNA  Estimated Blood Loss: Minimal  Intra-op Medications:   Medication Name Total Dose   ketorolac (Toradol) injection 15 mg 15 mg   lactated Ringer's infusion 1.67 mL   ondansetron (Zofran) injection 4 mg 4 mg   heparin (porcine) injection 5,000 Units 5,000 Units              Anesthesia Record               Intraprocedure I/O Totals          Intake    Phenylephrine Drip 0.00 mL    The total shown is the total volume documented since Anesthesia Start was filed.    lactated Ringer's infusion 1000.00 mL    Total Intake 1000 mL          Specimen:   ID Type Source Tests Collected by Time   1 : appendix Tissue APPENDIX SURGICAL PATHOLOGY EXAM Rinku Boyce MD 10/5/2023 1346        Staff:   Circulator: Lukasz Dailey RN; Annel Jain RN  Relief Scrub: Delores Hernandez  Scrub Person: Clemencia Chaidez         Drains and/or Catheters:   Urethral Catheter (Active)       Urethral Catheter Non-latex 16 Fr. (Active)       Tourniquet Times:         Implants:     Findings: Acute, non-perforated appendicitis    Indications: Olamide Gallardo is an 18 y.o. female who is having surgery for Appendicitis [K37].   Otherwise healthy 18-year-old female who presents to the emergency room with a 1 day history of abdominal pain.  Since of 23,000.  CT scan demonstrated acute nonperforated appendicitis with a fecalith.  Patient was started on  antibiotics given IV fluids.  She was seen and examined by myself and determined that she was a best candidate for a laparoscopic appendectomy.  Risk, benefits, alternatives were explained to the patient and she consented to the procedure above.    The patient was seen in the preoperative area. The risks, benefits, complications, treatment options, non-operative alternatives, expected recovery and outcomes were discussed with the patient. The possibilities of reaction to medication, pulmonary aspiration, injury to surrounding structures, bleeding, recurrent infection, the need for additional procedures, failure to diagnose a condition, and creating a complication requiring transfusion or operation were discussed with the patient. The patient concurred with the proposed plan, giving informed consent.  The site of surgery was properly noted/marked if necessary per policy. The patient has been actively warmed in preoperative area. Preoperative antibiotics were continued from the floor.   Venous thrombosis prophylaxis have been ordered including bilateral sequential compression devices and chemical prophylaxis    Procedure Details:     Back to the operating room and placed in supine position.  Patient underwent general anesthesia.  Sequential compression devices while the patient in working condition.  The patient had been given 5000 units of unfractionated heparin.  Patient was already on Zosyn perioperatively.  A Reyna catheter was placed for the duration of the surgery.  Patient went prepped and draped in usual sterile fashion.  Timeout was performed indicating correct patient, procedure, laterality.    1-2 cm curvilinear infraumbilical incision was made.  Dissection was carried out using combination of electrocautery and blunt dissection to the fascia.  The fascia was grasped and incised sharply.  The peritoneum was entered safely.  2-0 Vicryl stay sutures placed in superior and inferior portion of the incision.  A  10 mm Moreno trocar was placed into the abdomen insufflation was obtained.  Patient was placed in Trendelenburg position.  2 other 5 mm trocars were placed in the left lower quadrant and suprapubic region under direct visualization with the 10 mm camera.  Attention was next placed to the right lower quadrant.  Inflamed appendix was seen and carried down to the pelvis.  This was grasped carefully.  A window was made in the mesoappendix right at the base of the appendix and the cecum.  45 mm endoscopic stapler was placed in the window and transected the appendix.  A second blue load was placed along the mesoappendix and this was stapled.  The appendix was placed into an Endo Catch bag.  The right lower quadrant pelvis was copiously irrigated.  No other injuries or pathology was seen.  The staple line was hemostatic.  The patient was desufflated.  The cell trocar and other trocars were removed under direct visualization.  The 10 mm site was closed with the fascia with 0 Vicryl suture.  10 mm bupivacaine was used to anesthetize the trocar sites.  The trocar sites were all closed with the skin with 4-0 Monocryl with glue on top.  The Reyna catheter was removed.  The patient was extubated brought back to recovery in good condition.  The count was correct.  There were no complications.  I was present for the entirety of the procedure.  Complications:  None; patient tolerated the procedure well.    Disposition: PACU - hemodynamically stable.  Condition: stable         Additional Details: None    Attending Attestation: I was present and scrubbed for the entire procedure.    Rinku Boyce  Phone Number: [phone]

## 2023-10-05 NOTE — ANESTHESIA PREPROCEDURE EVALUATION
Patient: Olamide Gallardo    Procedure Information       Date/Time: 10/05/23 1100    Procedure: LAPAROSCOPIC APPENDECTOMY DR GARNETT TO PERFORM SURGERY (Abdomen)    Location: [facility] / [facility]    Surgeons: Irena Bernstein MD            Relevant Problems   No relevant active problems       History reviewed. No pertinent past medical history.   History reviewed. No pertinent surgical history.   Tobacco Use: Low Risk  (10/4/2023)    Patient History     Smoking Tobacco Use: Never     Smokeless Tobacco Use: Never     Passive Exposure: Not on file           Clinical information reviewed:   Tobacco  Allergies  Meds   Med Hx  Surg Hx   Fam Hx  Soc Hx        No data recorded     PHYSICAL EXAM    Anesthesia Plan    Anesthesia Evaluation

## 2023-10-05 NOTE — PROGRESS NOTES
Assessment/Plan   Principal Problem:    Appendicitis    Patient left the unit with transport headed for surgery.            LANDY GALLARDO RN

## 2023-10-05 NOTE — ANESTHESIA POSTPROCEDURE EVALUATION
Patient: Olamide Gallardo    Procedure Summary       Date: 10/05/23 Room / Location: [facility] / [facility]    Anesthesia Start: 1238 Anesthesia Stop: 1412    Procedure: Appendectomy Laparoscopy (Abdomen) Diagnosis:       Appendicitis      (Appendicitis [K37])    Surgeons: Rinku Boyce MD Responsible Provider: Abdi Caicedo MD    Anesthesia Type: general ASA Status: 1 - Emergent            Anesthesia Type: general    Vitals Value Taken Time   /44 10/05/23 1408   Temp 36.4 10/05/23 1412   Pulse 90 10/05/23 1410   Resp 13 10/05/23 1410   SpO2 100 % 10/05/23 1410   Vitals shown include unvalidated device data.    Anesthesia Post Evaluation    Patient location during evaluation: PACU  Patient participation: waiting for patient participation  Level of consciousness: sleepy but conscious  Pain score: 0  Pain management: adequate  Multimodal analgesia pain management approach  Airway patency: patent  Cardiovascular status: acceptable and stable  Respiratory status: acceptable and face mask  Hydration status: stable        No notable events documented.

## 2023-10-05 NOTE — ANESTHESIA PREPROCEDURE EVALUATION
Patient: Olamide Gallardo    Procedure Information       Date/Time: 10/05/23 1100    Procedure: LAPAROSCOPIC APPENDECTOMY DR GARNETT TO PERFORM SURGERY (Abdomen)    Location: [facility] / [facility]    Surgeons: Irena Bernstein MD            Relevant Problems   Anesthesia   (+) Malignant hyperthermia        Clinical information reviewed:   Tobacco  Allergies  Meds   Med Hx  Surg Hx   Fam Hx  Soc Hx        NPO Detail:  No data recorded     PHYSICAL EXAM    Anesthesia Plan

## 2023-10-05 NOTE — BRIEF OP NOTE
Date: 10/4/2023 - 10/5/2023  OR Location: [facility]    Name: Olamide Gallardo, : 2005, Age: 18 y.o., MRN: [id number], Sex: female    Diagnosis  Pre-op Diagnosis     * Appendicitis [K37] Post-op Diagnosis     * Appendicitis [K37]     Procedures  Appendectomy Laparoscopy  20064 - [place] LAPAROSCOPIC APPENDECTOMY      Surgeons      * Rinku Boyce - Primary    Resident/Fellow/Other Assistant:  No surgical staff documented.    Procedure Summary  Anesthesia: General  ASA: I  Anesthesia Staff: Anesthesiologist: Abdi Caicedo MD  CRNA: KRISS Felton-CRNA  Estimated Blood Loss: MinimalIntra-op Medications:   Medication Name Total Dose   ketorolac (Toradol) injection 15 mg 15 mg   lactated Ringer's infusion 1.67 mL   ondansetron (Zofran) injection 4 mg 4 mg   heparin (porcine) injection 5,000 Units 5,000 Units              Anesthesia Record               Intraprocedure I/O Totals          Intake    Phenylephrine Drip 0.00 mL    The total shown is the total volume documented since Anesthesia Start was filed.    lactated Ringer's infusion 1000.00 mL    Total Intake 1000 mL          Specimen:   ID Type Source Tests Collected by Time   1 : appendix Tissue APPENDIX SURGICAL PATHOLOGY EXAM Rinku Boyce MD 10/5/2023 3821        Staff:   Circulator: Lukasz Dailey RN; Annel Jain RN  Relief Scrub: Delores Hernandez  Scrub Person: Clemencia Chaidez          Findings: Acute non-perforated appendicitis    Complications:  None; patient tolerated the procedure well.     Disposition: PACU - hemodynamically stable.  Condition: stable  Specimens Collected:   ID Type Source Tests Collected by Time   1 : appendix Tissue APPENDIX SURGICAL PATHOLOGY EXAM Rinku Boyce MD 10/5/2023 6929     Attending Attestation: I was present and scrubbed for the entire procedure.    Rinku Boyce  Phone Number: [phone]

## 2023-10-05 NOTE — CARE PLAN
The patient's goals for the shift include      The clinical goals for the shift include pain control

## 2023-10-05 NOTE — ANESTHESIA PREPROCEDURE EVALUATION
Patient: Olamide Gallardo    Procedure Information       Date/Time: 10/05/23 1151    Procedure: Appendectomy Laparoscopy    Location: [facility] / [facility]    Surgeons: Rinku Boyce MD          Tobacco Use: Low Risk  (10/4/2023)    Patient History    • Smoking Tobacco Use: Never    • Smokeless Tobacco Use: Never    • Passive Exposure: Not on file     History reviewed. No pertinent past medical history.  History reviewed. No pertinent surgical history.    Relevant Problems   GI  Abdominal pain, appendicitis       Clinical information reviewed:   Tobacco  Allergies  Meds   Med Hx  Surg Hx   Fam Hx  Soc Hx        NPO Detail:  NPO/Void Status  Date of Last Liquid: 10/05/23  Time of Last Liquid: 0000  Date of Last Solid: 10/05/23  Time of Last Solid: 0000         Physical Exam    Airway  Mallampati: I  TM distance: >3 FB  Neck ROM: full     Cardiovascular   Rhythm: regular  Rate: normal     Dental - normal exam     Pulmonary - normal exam  Breath sounds clear to auscultation     Abdominal - normal exam           Anesthesia Plan    ASA 1 - emergent     general     intravenous induction   Anesthetic plan and risks discussed with patient.    Plan discussed with CRNA.

## 2023-10-05 NOTE — DISCHARGE SUMMARY
Discharge Diagnosis  Appendicitis    Issues Requiring Follow-Up  Follow up in 2 weeks    Test Results Pending At Discharge  Pending Labs       Order Current Status    Surgical Pathology Exam In process            Hospital Course   Possible start IV antibiotics and IV fluids.  Patient went to the operating room on 10 5 for an uncomplicated laparoscopic appendectomy.  She went to the regular nursing floor she tolerated diet and her pain was controlled.  She was discharged home with p.o. pain meds plan follow-up in 2 weeks.    Pertinent Physical Exam At Time of Discharge  Physical Exam    Home Medications     Medication List      START taking these medications     oxyCODONE 5 mg immediate release tablet; Commonly known as: Roxicodone;   Take 1 tablet (5 mg) by mouth every 6 hours if needed for severe pain (7 -   10).       Outpatient Follow-Up  No future appointments.    Rinku Boyce MD

## 2023-10-12 LAB
LABORATORY COMMENT REPORT: NORMAL
PATH REPORT.FINAL DX SPEC: NORMAL
PATH REPORT.GROSS SPEC: NORMAL
PATH REPORT.RELEVANT HX SPEC: NORMAL
PATH REPORT.TOTAL CANCER: NORMAL

## 2023-10-20 ENCOUNTER — APPOINTMENT (OUTPATIENT)
Dept: SURGERY | Facility: HOSPITAL | Age: 18
End: 2023-10-20
Payer: COMMERCIAL

## 2023-10-25 ENCOUNTER — APPOINTMENT (OUTPATIENT)
Dept: SURGERY | Facility: HOSPITAL | Age: 18
End: 2023-10-25
Payer: COMMERCIAL

## 2025-08-07 ENCOUNTER — OFFICE VISIT (OUTPATIENT)
Dept: URGENT CARE | Age: 20
End: 2025-08-07
Payer: COMMERCIAL

## 2025-08-07 VITALS
HEIGHT: 64 IN | OXYGEN SATURATION: 100 % | TEMPERATURE: 98.2 F | DIASTOLIC BLOOD PRESSURE: 87 MMHG | RESPIRATION RATE: 16 BRPM | SYSTOLIC BLOOD PRESSURE: 138 MMHG | WEIGHT: 113 LBS | HEART RATE: 82 BPM | BODY MASS INDEX: 19.29 KG/M2

## 2025-08-07 DIAGNOSIS — H65.92 LEFT NON-SUPPURATIVE OTITIS MEDIA: Primary | ICD-10-CM

## 2025-08-07 RX ORDER — AMOXICILLIN 875 MG/1
875 TABLET, COATED ORAL 2 TIMES DAILY
Qty: 20 TABLET | Refills: 0 | Status: SHIPPED | OUTPATIENT
Start: 2025-08-07 | End: 2025-08-12

## 2025-08-07 ASSESSMENT — ENCOUNTER SYMPTOMS
HEMATOLOGIC/LYMPHATIC NEGATIVE: 1
GASTROINTESTINAL NEGATIVE: 1
PSYCHIATRIC NEGATIVE: 1
MUSCULOSKELETAL NEGATIVE: 1
CONSTITUTIONAL NEGATIVE: 1
EYES NEGATIVE: 1
CARDIOVASCULAR NEGATIVE: 1
NEUROLOGICAL NEGATIVE: 1
RESPIRATORY NEGATIVE: 1
ALLERGIC/IMMUNOLOGIC NEGATIVE: 1

## 2025-08-07 NOTE — PROGRESS NOTES
"Subjective   Patient ID: Olamide Gallardo is a 20 y.o. female. They present today with a chief complaint of Earache (L ear; x3-4 days; radiates down neck).    History of Present Illness  Per patient, left ear pain with onset x 3-4 days. She states pain is constant pressure. She denies drainage from ear. Denies frequent swimming. She does have a history of ear infections and states symptoms are similar to past ear infections. She has no other concerns to address today.       Earache   Pertinent negatives include no ear discharge.       Past Medical History  Allergies as of 08/07/2025    (No Known Allergies)       Prescriptions Prior to Admission[1]     Medical History[2]    Surgical History[3]     reports that she has never smoked. She has never been exposed to tobacco smoke. She has never used smokeless tobacco. She reports that she does not currently use alcohol. She reports that she does not use drugs.    Review of Systems  Review of Systems   Constitutional: Negative.    HENT:  Positive for ear pain. Negative for ear discharge.    Eyes: Negative.    Respiratory: Negative.     Cardiovascular: Negative.    Gastrointestinal: Negative.    Genitourinary: Negative.    Musculoskeletal: Negative.    Skin: Negative.    Allergic/Immunologic: Negative.    Neurological: Negative.    Hematological: Negative.    Psychiatric/Behavioral: Negative.                                    Objective    Vitals:    08/07/25 0813   BP: 138/87   BP Location: Left arm   Patient Position: Sitting   BP Cuff Size: Adult   Pulse: 82   Resp: 16   Temp: 36.8 °C (98.2 °F)   TempSrc: Oral   SpO2: 100%   Weight: 51.3 kg (113 lb)   Height: 1.626 m (5' 4\")     Patient's last menstrual period was 07/20/2025 (approximate).    Physical Exam  Vitals and nursing note reviewed.   Constitutional:       General: She is not in acute distress.     Appearance: Normal appearance. She is not ill-appearing or toxic-appearing.   HENT:      Head: Normocephalic.      " Right Ear: Tympanic membrane, ear canal and external ear normal.      Left Ear: Ear canal and external ear normal. Tenderness present. No swelling. No mastoid tenderness. Tympanic membrane is erythematous.      Ears:      Comments: Answering all questions appropriately.      Nose: Nose normal.      Mouth/Throat:      Mouth: Mucous membranes are moist.      Pharynx: Oropharynx is clear.     Eyes:      Conjunctiva/sclera: Conjunctivae normal.       Cardiovascular:      Rate and Rhythm: Normal rate and regular rhythm.      Pulses: Normal pulses.      Heart sounds: Normal heart sounds.      Comments: 2+P radial pulses   Pulmonary:      Effort: Pulmonary effort is normal.      Breath sounds: Normal breath sounds.      Comments: Talking in complete sentences with no accessory muscle use.     Musculoskeletal:         General: Normal range of motion.      Cervical back: Normal range of motion.     Skin:     General: Skin is warm and dry.      Capillary Refill: Capillary refill takes less than 2 seconds.     Neurological:      Mental Status: She is alert and oriented to person, place, and time.     Psychiatric:         Mood and Affect: Mood normal.         Behavior: Behavior normal.         Procedures    Point of Care Test & Imaging Results from this visit  No results found for this visit on 08/07/25.   Imaging  No results found.    Cardiology, Vascular, and Other Imaging  No other imaging results found for the past 2 days      Diagnostic study results (if any) were reviewed by BROOKLYN Moulton.    Assessment/Plan   Allergies, medications, history, and pertinent labs/EKGs/Imaging reviewed by BROOKLYN Moulton.     Medical Decision Making    Patient presents to the urgent care for left ear pain with onset x 3-4 days. She states pain is constant pressure. She denies drainage from ear, fever, chills, sore throat, URI symptoms, and frequent swimming. She does have a history of ear infections and states symptoms are  similar to past ear infections. Left TM erythematous. Will treat for otitis media. Amoxicillin Rx. At time of discharge patient was clinically well-appearing and HDS for outpatient management. The patient was educated regarding diagnosis, supportive care, OTC and Rx medications. The patient was given the opportunity to ask questions prior to discharge.  She verbalized understanding of my discussion of the plans for treatment, expected course, indications to return to  or seek further evaluation in ED, and the need for timely follow up as directed.       Orders and Diagnoses  Diagnoses and all orders for this visit:  Left non-suppurative otitis media  -     amoxicillin (Amoxil) 875 mg tablet; Take 1 tablet (875 mg) by mouth 2 times a day for 5 days. Please discard remaining tablets      Medical Admin Record      Patient disposition: Home    Electronically signed by BROOKLYN Moulton  8:31 AM           [1] (Not in a hospital admission)   [2] History reviewed. No pertinent past medical history.  [3] History reviewed. No pertinent surgical history.

## 2025-08-07 NOTE — PATIENT INSTRUCTIONS
-Please take antibiotic as directed   -Tylenol and/or Motrin for pain if able to take these   -Warm compress/heating pad to the ear   -Do not swim and try to avoid water in your ears for the next 5-7 days  -Avoid cotton-tipped swabs in ears   -Avoid scratching the inside of your ear  -Follow up if not improved in 2-3 days with your PCP or return to the urgent care   -After bathing, you can dry ear canal with hair dryer on lowest setting  -When taking shower put Vaseline on cotton ball in ear to keep water from getting into ear

## (undated) DEVICE — STRAP, ARM BOARD, 32 X 1.5

## (undated) DEVICE — BOWL, BASIN, 32 OZ, STERILE

## (undated) DEVICE — SYRINGE, 10CC, CONTROL, STERILE

## (undated) DEVICE — TRAY, SURESTEP, SILICONE DRAINAGE BAG, STATLOCK, 16FR

## (undated) DEVICE — STAPLER, EF POWERED PLUS, CUTTER 340MM, 45MM EFFECTOR, DISP

## (undated) DEVICE — Device

## (undated) DEVICE — SUTURE, MONOCRYL, 4-0, 27 IN, PS-2, UNDYED

## (undated) DEVICE — PUMP, STRYKERFLOW 2 & HANDPIECE W/10FT. IRRIGATION TUBING

## (undated) DEVICE — SYSTEM, TROCAR LAP, 5X100MM, SHIELD BLADED, KII ADVANCED FIX ABDOMINAL

## (undated) DEVICE — SUTURE, VICRYL, 0, 27 IN, UR-6, VIOLET

## (undated) DEVICE — TOWEL PACK 10-PK

## (undated) DEVICE — TROCAR SYSTEM, BALLOON, KII GELPORT, 12 X 100MM

## (undated) DEVICE — APPLICATOR, CHLORAPREP, W/ORANGE TINT, 26ML

## (undated) DEVICE — GOWN, SURGICAL, ROYAL SILK, LG, STERILE

## (undated) DEVICE — HOLSTER, BOVIE, ES PENCIL, DISP

## (undated) DEVICE — PROTECTOR, NERVE, ULNAR, PINK

## (undated) DEVICE — RETRIEVAL SYSTEM, MONARCH, 10MM DISP ENDOSCOPIC

## (undated) DEVICE — DRAPE, SHEET, LAPAROTOMY, W/ISO-BAC, W/ARMBOARD COVERS, 98 X 122 IN, DISPOSABLE, LF, STERILE

## (undated) DEVICE — STRAP, VELCRO, BODY, 4 X 60IN, NS